# Patient Record
Sex: FEMALE | URBAN - METROPOLITAN AREA
[De-identification: names, ages, dates, MRNs, and addresses within clinical notes are randomized per-mention and may not be internally consistent; named-entity substitution may affect disease eponyms.]

---

## 2022-10-04 ENCOUNTER — OFFICE VISIT (OUTPATIENT)
Dept: URBAN - METROPOLITAN AREA CLINIC 82 | Facility: CLINIC | Age: 77
End: 2022-10-04

## 2022-10-11 ENCOUNTER — DASHBOARD ENCOUNTERS (OUTPATIENT)
Age: 77
End: 2022-10-11

## 2022-10-11 ENCOUNTER — WEB ENCOUNTER (OUTPATIENT)
Dept: URBAN - METROPOLITAN AREA CLINIC 82 | Facility: CLINIC | Age: 77
End: 2022-10-11

## 2022-10-11 ENCOUNTER — OFFICE VISIT (OUTPATIENT)
Dept: URBAN - METROPOLITAN AREA CLINIC 82 | Facility: CLINIC | Age: 77
End: 2022-10-11
Payer: MEDICARE

## 2022-10-11 VITALS
HEIGHT: 67 IN | BODY MASS INDEX: 33.43 KG/M2 | TEMPERATURE: 97.9 F | WEIGHT: 213 LBS | DIASTOLIC BLOOD PRESSURE: 75 MMHG | HEART RATE: 82 BPM | SYSTOLIC BLOOD PRESSURE: 132 MMHG

## 2022-10-11 DIAGNOSIS — R19.5 LOOSE STOOLS: ICD-10-CM

## 2022-10-11 DIAGNOSIS — R93.89 ABNORMAL CT SCAN: ICD-10-CM

## 2022-10-11 PROBLEM — 129679001: Status: ACTIVE | Noted: 2022-10-11

## 2022-10-11 PROCEDURE — 99204 OFFICE O/P NEW MOD 45 MIN: CPT | Performed by: INTERNAL MEDICINE

## 2022-10-11 RX ORDER — GABAPENTIN 600 MG/1
1 TABLET TABLET, FILM COATED ORAL ONCE A DAY
Status: ACTIVE | COMMUNITY

## 2022-10-11 RX ORDER — WARFARIN SODIUM 2.5 MG/1
1 TABLET TABLET ORAL ONCE A DAY
Status: ACTIVE | COMMUNITY

## 2022-10-11 RX ORDER — GLIMEPIRIDE 2 MG/1
1 TABLET WITH BREAKFAST OR THE FIRST MAIN MEAL OF THE DAY TABLET ORAL ONCE A DAY
Status: ACTIVE | COMMUNITY

## 2022-10-11 RX ORDER — FUROSEMIDE 40 MG/1
1 TABLET TABLET ORAL ONCE A DAY
Status: ACTIVE | COMMUNITY

## 2022-10-11 RX ORDER — LOSARTAN POTASSIUM 25 MG/1
1 TABLET TABLET ORAL ONCE A DAY
Status: ACTIVE | COMMUNITY

## 2022-10-11 RX ORDER — METFORMIN HYDROCHLORIDE 1000 MG/1
1 TABLET WITH A MEAL TABLET, FILM COATED ORAL ONCE A DAY
Status: ACTIVE | COMMUNITY

## 2022-10-11 RX ORDER — PRAVASTATIN SODIUM 20 MG/1
1 TABLET TABLET ORAL ONCE A DAY
Status: ACTIVE | COMMUNITY

## 2022-10-11 RX ORDER — CARVEDILOL 25 MG/1
1 TABLET WITH FOOD TABLET, FILM COATED ORAL TWICE A DAY
Status: ACTIVE | COMMUNITY

## 2022-10-11 RX ORDER — DIGOXIN 250 UG/1
1 TABLET TABLET ORAL ONCE A DAY
Status: ACTIVE | COMMUNITY

## 2022-10-11 NOTE — HPI-TODAY'S VISIT:
77-year-old female came in today for the office with complaints of having diagnosis of colitis based upon the CAT scan findings in July 2022.  Patient stated she has been living with her with here with her son and daughter-in-law.  Patient reports that they cook different foods in July she felt having symptoms of abdominal pain cramping and loose stools at that time at that time she was taken to the emergency room.  CAT scan of the abdomen and pelvis shows thickening of the thickening of the sigmoid colon.  Patient denies any chronic diarrhea she is on chronic narcotic use because of her joint pain arthritis and hip pain.  She reports anywhere from constipation to loose stools to sometimes even incontinence especially when she is passing flatus.  Work-up from the hospital has been reviewed that shows mild anemia.  Patient also reports being on anticoagulation because of a history of A. fib.  Patient reports having hip replacement in May 2023.  She reports having weight loss which was unintentional.  She lost about 25 pounds since July.  Reports having colonoscopy about 2 to 3 years ago through Dr. Solomon Cortez was told to be unremarkable.  Patient stated she is not eating very well because her from the food and the cook at her son's home are different and she does not tolerate it.  She reports cardiac disease has been stable.  Denies any nausea vomiting.  Patient reports having diarrhea loose stools anywhere from small to frequent to mid small volume to medium volume but denies any lower however at the same time she also have episodes of constipation.  Denies any blood mixed with the stools.  However she noticed some occasional mucus in the stool.  Upper GI symptoms of nausea present when she is not feeling well otherwise denies any melanotic stools.

## 2022-10-11 NOTE — PHYSICAL EXAM NECK/THYROID:
normal appearance , without tenderness upon palpation , no deformities , trachea midline , Thyroid normal size , no thyroid nodules , no masses , no JVD , thyroid nontender  pacemaker

## 2022-11-12 LAB
CALPROTECTIN, FECAL: 6
CLOSTRIDIUM DIFFICILE TOXINB,QL REAL TIME PCR: NOT DETECTED
FECAL FAT, QUALITATIVE: NORMAL
PANCREATIC ELASTASE, FECAL: 273